# Patient Record
Sex: FEMALE | Race: WHITE | NOT HISPANIC OR LATINO | URBAN - NONMETROPOLITAN AREA
[De-identification: names, ages, dates, MRNs, and addresses within clinical notes are randomized per-mention and may not be internally consistent; named-entity substitution may affect disease eponyms.]

---

## 2017-08-08 PROBLEM — E11.9: Noted: 2017-08-08

## 2017-08-08 PROBLEM — H04.123: Noted: 2017-08-08

## 2017-08-08 PROBLEM — H25.813: Noted: 2017-08-04

## 2019-02-12 ENCOUNTER — IMPORTED ENCOUNTER (OUTPATIENT)
Dept: URBAN - NONMETROPOLITAN AREA CLINIC 1 | Facility: CLINIC | Age: 64
End: 2019-02-12

## 2019-02-12 PROCEDURE — 92014 COMPRE OPH EXAM EST PT 1/>: CPT

## 2019-02-12 NOTE — PATIENT DISCUSSION
Cataract(s)-Visually significant cataract OD. -Cataract(s) causing symptomatic impairment of visual function not correctable with a tolerable change in glasses or contact lenses lighting or non-operative means resulting in specific activity limitations and/or participation restrictions including but not limited to reading viewing television driving or meeting vocational or recreational needs. -Expectation is clearer vision and functional improvement in symptoms as well as reduced glare disability after cataract removal.-Order IOLMaster and OPD today. -Recommend traditional or LenSx w/ Limbal Relaxing Incisions based on today's OPD testing and lifestyle questionnaire.-All questions were answered regarding surgery including pre and post-op medications appointments activity restrictions and anesthetic usage.-The risks benefits and alternatives and special risk factors for the patient were discussed in detail including but not limited to: bleeding infection retinal detachment vitreous loss problems with the implant and possible need for additional surgery.-Although rare the possibility of complete vision loss was discussed.-The possible need for glasses post-operatively was discussed.-Order H&P based on history of NIDDM. -Patient elects to proceed with cataract surgery OD. Will schedule at patient's convenience and re-evaluate OS  in the future.

## 2019-03-15 PROBLEM — H25.813: Noted: 2019-03-15

## 2019-03-15 PROBLEM — H04.123: Noted: 2019-03-15

## 2019-03-15 PROBLEM — E11.9: Noted: 2019-03-15

## 2019-03-19 ENCOUNTER — IMPORTED ENCOUNTER (OUTPATIENT)
Dept: URBAN - NONMETROPOLITAN AREA CLINIC 1 | Facility: CLINIC | Age: 64
End: 2019-03-19

## 2019-04-09 ENCOUNTER — IMPORTED ENCOUNTER (OUTPATIENT)
Dept: URBAN - NONMETROPOLITAN AREA CLINIC 1 | Facility: CLINIC | Age: 64
End: 2019-04-09

## 2019-04-09 NOTE — PATIENT DISCUSSION
s/p CE OD Stand/Trad  04/08/2019	  The pt has undergone successful cataract extraction with Intraocular lens implantation in the right eye.-	  PO examination is normal and visual acuity has improved. -	  The pt has been instructed to call the office immediately if there is increased redness pain or vision loss. -	  Instructed patient not to rub the eye and don’t go swimming. -	  Pt should return in 1 week for follow up. -	  Ocular meds plan discussed and patient received printed take home instructions.

## 2019-04-12 ENCOUNTER — IMPORTED ENCOUNTER (OUTPATIENT)
Dept: URBAN - NONMETROPOLITAN AREA CLINIC 1 | Facility: CLINIC | Age: 64
End: 2019-04-12

## 2019-04-12 PROCEDURE — 99212 OFFICE O/P EST SF 10 MIN: CPT

## 2019-04-12 PROCEDURE — 99024 POSTOP FOLLOW-UP VISIT: CPT

## 2019-04-12 NOTE — PATIENT DISCUSSION
Medical Clearance No outstanding concernsPt cleared for surgery. Cataract(s)-Visually significant cataract OS. -Cataract(s) causing symptomatic impairment of visual function not correctable with a tolerable change in glasses or contact lenses lighting or non-operative means resulting in specific activity limitations and/or participation restrictions including but not limited to reading viewing television driving or meeting vocational or recreational needs. -Expectation is clearer vision and functional improvement in symptoms as well as reduced glare disability after cataract removal.-Recommend Stand/Trad based on previous OPD testing and lifestyle questionnaire.-All questions were answered regarding surgery including pre and post-op medications appointments activity restrictions and anesthetic usage.-The risks benefits and alternatives and special risk factors for the patient were discussed in detail including but not limited to: bleeding infection retinal detachment vitreous loss problems with the implant and possible need for additional surgery.-Although rare the possibility of complete vision loss was discussed.-The need for glasses post-operatively was discussed.-Patient elects to proceed with cataract surgery OS. Will schedule at patient's convenience. s/p PCIOL OD Stand/Trad -Pt doing well at 1 week s/p PCIOL. -Continue post-op gtts according to instruction sheet.-Okay to resume usual activites and d/c eye shield. Pt cleared for cataract surgery

## 2019-04-25 ENCOUNTER — IMPORTED ENCOUNTER (OUTPATIENT)
Dept: URBAN - NONMETROPOLITAN AREA CLINIC 1 | Facility: CLINIC | Age: 64
End: 2019-04-25

## 2019-04-25 PROBLEM — Z98.42: Noted: 2019-04-25

## 2019-04-25 NOTE — PATIENT DISCUSSION
s/p PC IOL OS Stand/Trad 4/25/2019-  discussed findings w/patient-  Pt doing well s/p PCIOL. -  Continue post-op gtts according to instruction sheet and sleep with eye shield over eye for 7 nights. -  Avoid bending at the waist lifting anything over 5lbs and dirty or estevan environments.-  RTC 1 week as scheduled or prn

## 2019-05-03 ENCOUNTER — IMPORTED ENCOUNTER (OUTPATIENT)
Dept: URBAN - NONMETROPOLITAN AREA CLINIC 1 | Facility: CLINIC | Age: 64
End: 2019-05-03

## 2019-05-03 PROCEDURE — 99024 POSTOP FOLLOW-UP VISIT: CPT

## 2019-05-03 NOTE — PATIENT DISCUSSION
s/p PC IOL OS Stand/Trad 4/25/2019-  discussed findings w/patient-  Pt doing well at 1 week s/p PCIOL. -  Continue post-op gtts according to instruction sheet.-  Okay to resume usual activites and d/c eye shield. -  RTC 3 mo Pseudophakia w/DFE OU; 's Notes: LAURYNFE

## 2019-05-14 ENCOUNTER — IMPORTED ENCOUNTER (OUTPATIENT)
Dept: URBAN - NONMETROPOLITAN AREA CLINIC 1 | Facility: CLINIC | Age: 64
End: 2019-05-14

## 2019-05-14 PROCEDURE — 99024 POSTOP FOLLOW-UP VISIT: CPT

## 2019-05-14 NOTE — PATIENT DISCUSSION
s/p PC IOL OS Stand/Trad 4/25/2019-  discussed findings w/patient-  d/c Durezol-  start Pred forte 1% qid-  start  Prolensa qd -  RTC 1 week or prn; 's Notes: LAURYNFE

## 2019-05-21 ENCOUNTER — IMPORTED ENCOUNTER (OUTPATIENT)
Dept: URBAN - NONMETROPOLITAN AREA CLINIC 1 | Facility: CLINIC | Age: 64
End: 2019-05-21

## 2019-05-21 PROCEDURE — 99024 POSTOP FOLLOW-UP VISIT: CPT

## 2019-05-21 NOTE — PATIENT DISCUSSION
s/p PC IOL OS Stand/Trad 4/25/2019-  discussed findings w/patient-  condition improved today -  decrease Pred forte 1% to TID x 1 week BID x 1 week then RTC-  continue Prolensa QD -  RTC 2 week or prn; 's Notes: LAURYNFE

## 2019-05-28 PROBLEM — H04.123: Noted: 2019-05-28

## 2019-05-28 PROBLEM — Z98.41: Noted: 2019-05-28

## 2019-05-28 PROBLEM — I10: Noted: 2019-05-28

## 2019-05-28 PROBLEM — E11.9: Noted: 2019-05-28

## 2019-05-28 PROBLEM — H25.811: Noted: 2019-05-28

## 2019-06-04 ENCOUNTER — IMPORTED ENCOUNTER (OUTPATIENT)
Dept: URBAN - NONMETROPOLITAN AREA CLINIC 1 | Facility: CLINIC | Age: 64
End: 2019-06-04

## 2019-06-04 PROCEDURE — 99024 POSTOP FOLLOW-UP VISIT: CPT

## 2019-06-04 NOTE — PATIENT DISCUSSION
s/p PC IOL OS Stand/Trad 4/25/2019-  discussed findings w/patient-  condition improved today -  decrease Pred forte 1% to TID x 1 week BID x 1 week then RTC-  continue Prolensa QD -  RTC 2 week f/u with Refraction or prnDermatochalasis OU-  discussed findings w/patient-  she feels that she has a pressure over her eyes and does not see as well until she lifts the eyelids and then things are much better-  refer to Alyssa Polanco for further evaluation patient agrees with plan; 's Notes: TONY

## 2019-06-06 PROBLEM — Z98.41: Noted: 2019-06-04

## 2019-06-06 PROBLEM — H26.493: Noted: 2019-06-04

## 2019-06-06 PROBLEM — Z98.42: Noted: 2019-04-25

## 2019-06-06 PROBLEM — H20.042: Noted: 2019-06-06

## 2019-06-15 ENCOUNTER — IMPORTED ENCOUNTER (OUTPATIENT)
Dept: URBAN - NONMETROPOLITAN AREA CLINIC 1 | Facility: CLINIC | Age: 64
End: 2019-06-15

## 2019-06-15 PROBLEM — H02.413: Noted: 2019-06-15

## 2019-06-15 PROBLEM — Z98.41: Noted: 2019-06-15

## 2019-06-15 PROBLEM — H02.834: Noted: 2019-06-15

## 2019-06-15 PROBLEM — Z98.42: Noted: 2019-06-15

## 2019-06-15 PROBLEM — H02.831: Noted: 2019-06-15

## 2019-06-15 PROBLEM — H26.493: Noted: 2019-06-15

## 2019-06-15 PROCEDURE — 92012 INTRM OPH EXAM EST PATIENT: CPT

## 2019-06-27 ENCOUNTER — IMPORTED ENCOUNTER (OUTPATIENT)
Dept: URBAN - NONMETROPOLITAN AREA CLINIC 1 | Facility: CLINIC | Age: 64
End: 2019-06-27

## 2019-06-27 PROCEDURE — 99024 POSTOP FOLLOW-UP VISIT: CPT

## 2019-06-27 PROCEDURE — 92015 DETERMINE REFRACTIVE STATE: CPT

## 2019-06-27 NOTE — PATIENT DISCUSSION
Pseudophakia OU -  discussed findings w/patient-  new spectacle Rx issued-  patient interested in CL RTC PRN for CL fitting-  1-2+ PCO OD and 2+ PCO OS not yet surgical -  monitor as scheduled or prn for Ptosis appointments-  monitor 3 month DFE; 's Notes: MR 6/27/2019DFE

## 2019-06-27 NOTE — PATIENT DISCUSSION
"Ptosis Vf and photos done today 6/27/19 -JMSMRD1 1. 5BLF: 14 - lagTBUT: 15 seconds""Ptosis/Bleph OU.-Ptosis (the upper eyelid being in a lower than normal position) of the upper eyelid was explained to the patient. -RBAs of ptosis repair discussed with patient. Treatment options include observation or surgical correction.-Due to affect of dermatochalasis on aesthetic outcome recommend pt have blepharoplasty during ptosis repair.-Will order ptosis VF and external photos and review results with patient.-Pt to dc Baby ASA 3 weeks in advance. Pt self started. Pt wishes to have done in [facility] OR if possible. If insurance will not cover will have in [facility] office.   [place] to schedule.; 's Notes: MRDFE"

## 2019-08-02 ENCOUNTER — IMPORTED ENCOUNTER (OUTPATIENT)
Dept: URBAN - NONMETROPOLITAN AREA CLINIC 1 | Facility: CLINIC | Age: 64
End: 2019-08-02

## 2019-08-02 PROBLEM — H02.413: Noted: 2019-08-02

## 2019-08-02 PROBLEM — H02.831: Noted: 2019-08-02

## 2019-08-02 PROBLEM — H26.493: Noted: 2019-08-02

## 2019-08-02 PROBLEM — H02.413: Noted: 2020-02-18

## 2019-08-02 PROBLEM — Z96.1: Noted: 2019-08-02

## 2019-08-02 PROBLEM — H02.831: Noted: 2020-02-18

## 2019-08-02 PROBLEM — H02.834: Noted: 2019-08-02

## 2019-08-02 PROBLEM — E11.9: Noted: 2020-02-18

## 2019-08-02 PROBLEM — H02.834: Noted: 2020-02-18

## 2019-08-02 PROCEDURE — 99213 OFFICE O/P EST LOW 20 MIN: CPT

## 2019-08-02 NOTE — PATIENT DISCUSSION
Pseudophakia OU w/PCO OU-  discussed findings w/patient-  3+ PCO noted OU visually significant with decreased vision-  discussed rferral to [address] [place] [address] for PCO Eval patient agrees with plan -  refer to [address] [place] [address] for [facility]; 's Notes: MRDFE

## 2019-09-13 ENCOUNTER — IMPORTED ENCOUNTER (OUTPATIENT)
Dept: URBAN - NONMETROPOLITAN AREA CLINIC 1 | Facility: CLINIC | Age: 64
End: 2019-09-13

## 2019-09-13 PROCEDURE — 92014 COMPRE OPH EXAM EST PT 1/>: CPT

## 2019-09-13 PROCEDURE — 66821 AFTER CATARACT LASER SURGERY: CPT

## 2019-09-13 NOTE — PATIENT DISCUSSION
PCO-Explained PCO and RBAs of YAG Capsulotomy to pt. -Pt elects to proceed.  YAG Caps OS today and YAG Caps OD in 1-2 weeks.; 's Notes: MRDFE

## 2019-10-28 ENCOUNTER — IMPORTED ENCOUNTER (OUTPATIENT)
Dept: URBAN - NONMETROPOLITAN AREA CLINIC 1 | Facility: CLINIC | Age: 64
End: 2019-10-28

## 2019-10-28 PROCEDURE — 66821 AFTER CATARACT LASER SURGERY: CPT

## 2019-10-28 NOTE — PATIENT DISCUSSION
PCO-Explained PCO and RBAs of YAG Capsulotomy to pt. -Pt elects to proceed.  YAG Caps OD today.; Dr's Notes: LAURYNFE

## 2020-02-18 ENCOUNTER — IMPORTED ENCOUNTER (OUTPATIENT)
Dept: URBAN - NONMETROPOLITAN AREA CLINIC 1 | Facility: CLINIC | Age: 65
End: 2020-02-18

## 2020-02-18 PROCEDURE — 92014 COMPRE OPH EXAM EST PT 1/>: CPT

## 2020-02-18 NOTE — PATIENT DISCUSSION
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 Bernardo Gamino-  monitor yearly or prn Ptosis/Dermatochalasis OU-  discussed findings w/patient-  she is scheduled for surgery in March-  patient will see Dr. Rosa Marin this Saturday for pre-op-  continue to monitor as per Jose Monte Dr's Notes: LAURYNFE

## 2020-06-22 ENCOUNTER — IMPORTED ENCOUNTER (OUTPATIENT)
Dept: URBAN - NONMETROPOLITAN AREA CLINIC 1 | Facility: CLINIC | Age: 65
End: 2020-06-22

## 2020-06-22 PROBLEM — R51: Noted: 2020-06-22

## 2020-06-22 PROBLEM — H02.834: Noted: 2020-06-22

## 2020-06-22 PROBLEM — Z96.1: Noted: 2020-06-22

## 2020-06-22 PROBLEM — E11.9: Noted: 2020-06-22

## 2020-06-22 PROBLEM — H02.831: Noted: 2020-06-22

## 2020-06-22 PROBLEM — H02.413: Noted: 2020-06-22

## 2020-06-22 PROCEDURE — 92012 INTRM OPH EXAM EST PATIENT: CPT

## 2020-06-22 NOTE — PATIENT DISCUSSION
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 Patient states she originally wanted it scheduled at the hospital because she feels safer.  Informed patient I only operate one day a month at the hospital patient states understanding.-  Continue to monitor as per JSHeadaches/ Sinus Pressure -  Discussed diagnosis in detail with patient -  Dicussed signs and symptoms associated -  Discussed ordering sinus x-rays if needed -  Continue to monitor; 's Notes: LAURYNFE

## 2020-07-02 ENCOUNTER — IMPORTED ENCOUNTER (OUTPATIENT)
Dept: URBAN - NONMETROPOLITAN AREA CLINIC 1 | Facility: CLINIC | Age: 65
End: 2020-07-02

## 2020-07-02 PROCEDURE — 92285 EXTERNAL OCULAR PHOTOGRAPHY: CPT

## 2020-08-25 ENCOUNTER — IMPORTED ENCOUNTER (OUTPATIENT)
Dept: URBAN - NONMETROPOLITAN AREA CLINIC 1 | Facility: CLINIC | Age: 65
End: 2020-08-25

## 2020-08-25 PROBLEM — E11.9: Noted: 2020-08-25

## 2020-08-25 PROBLEM — R51: Noted: 2020-06-22

## 2020-08-25 PROBLEM — H02.834: Noted: 2020-06-22

## 2020-08-25 PROBLEM — H02.831: Noted: 2020-06-22

## 2020-08-25 PROBLEM — H02.413: Noted: 2020-06-22

## 2020-08-25 PROBLEM — Z96.1: Noted: 2020-08-25

## 2020-08-25 PROCEDURE — 92014 COMPRE OPH EXAM EST PT 1/>: CPT

## 2020-08-25 NOTE — PATIENT DISCUSSION
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 Reviewed previous and most recent VF with her. -  Previously scheduled at hospital recommend surgery @ [facility] and/or [facility]. Patient states she originally wanted it scheduled at the hospital because she feels safer. Informed patient I only operate one day a month at the hospital patient states understanding. **Reveiwed VF with patient today: multiple fixation losses OS  OD is reliable OS is unreliable d/t the FL. May need patient to repeat her OS VF. WIll have [place] look at patient chart and review her insurance and see if we can go ahead and schedule her[facility] know repeat the [facility]. Pt ok to be scheduled for surgery. [place] to relay info back to Dr. Alonso Zabala** She is on Eliquis need to contact and discuss with cardio Dr. Nanci Muñoz if she is ok to come off this to have the procedure. [facility] . Will call her cardiologist before discussing repeating any testing if necessary. Informed patient she will need a  the day of. [facility] [place]  **Patient will need Valium RX if proceeding* Pseudophakia OU s/p YAG Caps OU -  discussed findings w/patient-  stable at this time-  monitor yearly or prn; 's Notes: MRDFE

## 2020-12-07 ENCOUNTER — IMPORTED ENCOUNTER (OUTPATIENT)
Dept: URBAN - NONMETROPOLITAN AREA CLINIC 1 | Facility: CLINIC | Age: 65
End: 2020-12-07

## 2020-12-07 PROBLEM — H16.223: Noted: 2020-12-07

## 2020-12-07 PROBLEM — R51: Noted: 2020-12-07

## 2020-12-07 PROBLEM — H02.834: Noted: 2020-12-07

## 2020-12-07 PROBLEM — E11.9: Noted: 2020-12-07

## 2020-12-07 PROBLEM — Z96.1: Noted: 2020-12-07

## 2020-12-07 PROBLEM — H02.831: Noted: 2020-12-07

## 2020-12-07 PROBLEM — H02.413: Noted: 2020-12-07

## 2020-12-07 PROCEDURE — 92012 INTRM OPH EXAM EST PATIENT: CPT

## 2020-12-07 NOTE — PATIENT DISCUSSION
ELBERT sánchez Retinopathy - Patient requested appointment today b/c she thought VA worse - No BDR noted on today's exam - Stressed importance of maintaining strict blood sugar control - Continue to monitor MOIRA OU - Discussed diagnosis in detail w/ patient - Discussed signs and symptoms associated including decreased VA - Recommend daily lid hygiene - Recommend using Refresh Jono 3's throughout the day samples given in office - Recommend using Refresh Relieva throughout the day samples given in office - Coupons given in office - Continue to monitor Pseudohpakia OU - Open Capsules OU - Continue to monitor Presbyopia - MR done today: patient defers MR; Dr's Notes: MRDFE

## 2022-04-10 ASSESSMENT — VISUAL ACUITY
OS_CC: 20/20
OD_CC: 20/20
OS_GLARE: 20/50
OS_GLARE: 20/25
OD_CC: 20/20
OS_CC: 20/20
OD_SC: 20/60
OS_CC: 20/25
OS_CC: 20/25
OS_CC: 20/25+
OS_GLARE: 20/25
OS_CC: 20/25
OS_AM: 20/20
OD_CC: 20/20
OU_CC: J1
OD_GLARE: 20/50
OU_CC: 20/20
OU_CC: 20/20
OD_CC: 20/20
OS_CC: 20/25
OS_CC: 20/25
OD_GLARE: 20/25
OS_CC: 20/25
OU_CC: J1+
OS_CC: 20/20-
OS_CC: 20/20
OD_CC: 20/30
OS_CC: 20/20- SLOW
OD_CC: 20/20-2
OD_PAM: 20/20
OD_CC: 20/25
OD_CC: 20/25-1
OD_CC: 20/20
OS_AM: 20/20
OS_CC: 20/20
OD_CC: 20/20 SLOW
OD_CC: 20/20-1
OD_GLARE: 20/30
OS_GLARE: 20/25
OS_CC: 20/25-2
OD_CC: 20/20-
OS_CC: 20/20
OD_CC: 20/25-

## 2022-04-10 ASSESSMENT — TONOMETRY
OS_IOP_MMHG: 16
OD_IOP_MMHG: 15
OD_IOP_MMHG: 16
OD_IOP_MMHG: 14
OD_IOP_MMHG: 14
OD_IOP_MMHG: 18
OS_IOP_MMHG: 20
OD_IOP_MMHG: 12
OD_IOP_MMHG: 16
OS_IOP_MMHG: 13
OS_IOP_MMHG: 18
OD_IOP_MMHG: 16
OD_IOP_MMHG: 18
OD_IOP_MMHG: 12
OS_IOP_MMHG: 13
OS_IOP_MMHG: 15
OS_IOP_MMHG: 14
OD_IOP_MMHG: 17
OS_IOP_MMHG: 16
OD_IOP_MMHG: 22
OS_IOP_MMHG: 14
OS_IOP_MMHG: 15
OS_IOP_MMHG: 18
OS_IOP_MMHG: 16
OS_IOP_MMHG: 15
OS_IOP_MMHG: 18
OS_IOP_MMHG: 16
OD_IOP_MMHG: 14
OD_IOP_MMHG: 14
OD_IOP_MMHG: 15
OD_IOP_MMHG: 15